# Patient Record
Sex: FEMALE | Race: WHITE | RURAL
[De-identification: names, ages, dates, MRNs, and addresses within clinical notes are randomized per-mention and may not be internally consistent; named-entity substitution may affect disease eponyms.]

---

## 2017-01-03 ENCOUNTER — OFFICE VISIT (OUTPATIENT)
Dept: FAMILY MEDICINE CLINIC | Age: 22
End: 2017-01-03

## 2017-01-03 VITALS
WEIGHT: 144.2 LBS | HEIGHT: 64 IN | TEMPERATURE: 98.2 F | RESPIRATION RATE: 18 BRPM | DIASTOLIC BLOOD PRESSURE: 71 MMHG | BODY MASS INDEX: 24.62 KG/M2 | HEART RATE: 96 BPM | SYSTOLIC BLOOD PRESSURE: 115 MMHG | OXYGEN SATURATION: 98 %

## 2017-01-03 DIAGNOSIS — J45.40 MODERATE PERSISTENT ASTHMA WITHOUT COMPLICATION: Primary | ICD-10-CM

## 2017-01-03 NOTE — PATIENT INSTRUCTIONS
Asthma Action Plan: After Your Visit  Your Care Instructions  An asthma action plan is based on peak flow and asthma symptoms. Sorting symptoms and peak flow into red, yellow, and green \"zones\" can help you know how bad your asthma is and what actions you should take. Work with your doctor to make your plan. An action plan may include:  · The peak flow readings and symptoms for each zone. · What medicines to take in each zone. · When to call a doctor. · A list of emergency contact numbers. · A list of your asthma triggers. Follow-up care is a key part of your treatment and safety. Be sure to make and go to all appointments, and call your doctor if you are having problems. It's also a good idea to know your test results and keep a list of the medicines you take. How can you care for yourself at home? · Take your daily medicines to help minimize long-term damage and avoid asthma attacks. · Check your peak flow every morning and evening. This is the best way to know how well your lungs are working. · Check your action plan to see what zone you are in.  ¨ If you are in the green zone, keep taking your daily asthma medicines as prescribed. ¨ If you are in the yellow zone, you may be having or will soon have an asthma attack. You may not have any symptoms, but your lungs are not working as well as they should. Take the medicines listed in your action plan. If you stay in the yellow zone, your doctor may need to increase the dose or add a medicine. ¨ If you are in the red zone, follow your action plan. If your symptoms or peak flow don't improve soon, you may need to go to the emergency room or be admitted to the hospital.  · Use an asthma diary. Write down your peak flow readings in the asthma diary. If you have an attack, write down what caused it (if you know), the symptoms, and what medicine you took.   · Make sure you know how and when to call your doctor or go to the hospital.  · Take both the asthma action plan and the asthma diary--along with your peak flow meter and medicines--when you see your doctor. Tell your doctor if you are having trouble following your action plan. When should you call for help? Call 911 anytime you think you may need emergency care. For example, call if:  · You have severe trouble breathing. Call your doctor now or seek immediate medical care if:  · Your symptoms do not get better after you have followed your asthma action plan. · You cough up yellow, dark brown, or bloody mucus (sputum). Watch closely for changes in your health, and be sure to contact your doctor if:  · Your coughing and wheezing get worse. · You need to use quick-relief medicine on more than 2 days a week (unless it is just for exercise). · You need help figuring out what is triggering your asthma attacks. Where can you learn more? Go to [employer] Live [employer]  Enter B511 in the search box to learn more about \"Asthma Action Plan: After Your Visit. \"   © 5242-4841 [employer]. Care instructions adapted under license by [employer] (which disclaims liability or warranty for this information). This care instruction is for use with your licensed healthcare professional. If you have questions about a medical condition or this instruction, always ask your healthcare professional. [address] any warranty or liability for your use of this information. Content Version: 63.9.218826; Last Revised: March 9, 2012                 Asthma in Adults: Care Instructions  Your Care Instructions    During an asthma attack, your airways swell and narrow as a reaction to certain things (triggers). This makes it hard to breathe. You may be able to prevent asthma attacks if you avoid the things that set off your asthma symptoms. Keeping your asthma under control and treating symptoms before they get bad can help you avoid severe attacks.   If you can control your asthma, you may be able to do all of your normal daily activities. You may also avoid asthma attacks and trips to the hospital.  Follow-up care is a key part of your treatment and safety. Be sure to make and go to all appointments, and call your doctor if you are having problems. It's also a good idea to know your test results and keep a list of the medicines you take. How can you care for yourself at home? · Follow your asthma action plan so you can manage your symptoms at home. An asthma action plan will help you prevent and control airway reactions and will tell you what to do during an asthma attack. If you do not have an asthma action plan, work with your doctor to build one. · Take your asthma medicine exactly as prescribed. Medicine plays an important role in controlling asthma. Talk to your doctor right away if you have any questions about what to take and how to take it. ¨ Use your quick-relief medicine when you have symptoms of an attack. Quick-relief medicine often is an albuterol inhaler. Some people need to use quick-relief medicine before they exercise. ¨ Take your controller medicine every day, not just when you have symptoms. Controller medicine is usually an inhaled corticosteroid. The goal is to prevent problems before they occur. Do not use your controller medicine to try to treat an attack that has already started. It does not work fast enough to help. ¨ If your doctor prescribed corticosteroid pills to use during an attack, take them as directed. They may take hours to work, but they may shorten the attack and help you breathe better. ¨ Keep your quick-relief medicine with you at all times. · Talk to your doctor before using other medicines. Some medicines, such as aspirin, can cause asthma attacks in some people. · Check yourself for asthma symptoms to know which step to follow in your action plan. Watch for things like being short of breath, having chest tightness, coughing, and wheezing.  Also notice if symptoms wake you up at night or if you get tired quickly when you exercise. · If you have a peak flow meter, use it to check how well you are breathing. This can help you predict when an asthma attack is going to occur. Then you can take medicine to prevent the asthma attack or make it less severe. · See your doctor regularly. These visits will help you learn more about asthma and what you can do to control it. Your doctor will monitor your treatment to make sure the medicine is helping you. · Keep track of your asthma attacks and your treatment. After you have had an attack, write down what triggered it, what helped end it, and any concerns you have about your asthma action plan. Take your diary when you see your doctor. You can then review your asthma action plan and decide if it is working. · Do not smoke or allow others to smoke around you. Avoid smoky places. Smoking makes asthma worse. If you need help quitting, talk to your doctor about stop-smoking programs and medicines. These can increase your chances of quitting for good. · Learn what triggers an asthma attack for you, and avoid the triggers when you can. Common triggers include colds, smoke, air pollution, dust, pollen, mold, pets, cockroaches, stress, and cold air. · Avoid colds and the flu. Get a pneumococcal vaccine shot. If you have had one before, ask your doctor whether you need a second dose. Get a flu vaccine every fall. If you must be around people with colds or the flu, wash your hands often. When should you call for help? Call 911 anytime you think you may need emergency care. For example, call if:  · You have severe trouble breathing. Call your doctor now or seek immediate medical care if:  · Your symptoms do not get better after you have followed your asthma action plan. · You cough up yellow, dark brown, or bloody mucus (sputum).   Watch closely for changes in your health, and be sure to contact your doctor if:  · Your coughing and wheezing get worse. · You need to use quick-relief medicine on more than 2 days a week (unless it is just for exercise). · You need help figuring out what is triggering your asthma attacks. Where can you learn more? Go to http://last-nasir.info/. Enter P597 in the search box to learn more about \"Asthma in Adults: Care Instructions. \"  Current as of: May 23, 2016  Content Version: 11.1  © 5614-3747 [employer]. Care instructions adapted under license by [employer] (which disclaims liability or warranty for this information). If you have questions about a medical condition or this instruction, always ask your healthcare professional. [place] [address] any warranty or liability for your use of this information. Learning About Asthma  What is asthma? Asthma is a long-term condition that affects your breathing. It causes the airways that lead to the lungs to swell. People with asthma may have asthma attacks. During an asthma attack, the airways tighten and become narrower. This makes it hard to breathe, and you may wheeze or cough. If you have a bad asthma attack, you may need emergency care. Asthma affects people in different ways. Some people only have asthma attacks during allergy season, or when they breathe in cold air, or when they exercise. Others have many bad attacks that send them to the doctor often. What are the symptoms? Symptoms of asthma can be mild or severe. You may have mild attacks now and then, you may have severe symptoms every day, or you may have something in between. How often you have symptoms can also change. When you have asthma, you may:  · Wheeze, making a loud or soft whistling noise when you breathe in and out. · Cough a lot. · Feel tightness in your chest.  · Feel short of breath. · Have trouble sleeping because of coughing or having a hard time breathing. · Get tired quickly during exercise.   Your symptoms may be worse at night. How can you prevent asthma attacks? Certain things can make asthma symptoms worse. These are called triggers. When you are around a trigger, an asthma attack is more likely. Common triggers include:  · Cigarette smoke or air pollution. · Things you are allergic to, such as:  ¨ Pollen, mold, or dust mites. ¨ Pet hair, skin, or saliva. · Illnesses, like colds, flu, or pneumonia. · Exercise. · Dry, cold air. Here are some ways to avoid a few common triggers:  · Do not smoke or allow others to smoke around you. If you need help quitting, talk to your doctor about stop-smoking programs and medicines. These can increase your chances of quitting for good. · If there is a lot of pollution, pollen, or dust outside, stay at home and keep your windows closed. Use an air conditioner or air filter in your home. Check your local weather report or newspaper for air quality and pollen reports. · Get the flu vaccine every year. Talk to your doctor about getting a pneumococcal shot. Wash your hands often to prevent infections. · Avoid exercising outdoors in cold weather. If you are outdoors in cold weather, wear a scarf around your face and breathe through your nose. How is asthma treated? There are two parts to treating asthma, which are outlined in your asthma action plan. The goals are to:  · Control asthma over the long term. The asthma action plan tells you which medicine you may need to take every day. This is called a controller medicine. It helps to reduce the swelling of the airways and prevent asthma attacks. · Treat asthma attacks when they occur. The asthma action plan tells you what to do when you have an asthma attack. It helps you identify triggers that can cause your attacks. You use quick-relief medicine during an attack. The asthma plan also helps you track your symptoms and know how well the treatment is working.   Follow-up care is a key part of your treatment and safety. Be sure to make and go to all appointments, and call your doctor if you are having problems. It's also a good idea to know your test results and keep a list of the medicines you take. Where can you learn more? Go to http://last-nasir.info/. Enter 9187 [id number] in the search box to learn more about \"Learning About Asthma. \"  Current as of: May 23, 2016  Content Version: 11.1  © 2248-1906 [employer]. Care instructions adapted under license by [employer] (which disclaims liability or warranty for this information). If you have questions about a medical condition or this instruction, always ask your healthcare professional. [address] any warranty or liability for your use of this information. Learning About the HPV Vaccine  What is the HPV vaccine? The HPV (human papillomavirus) vaccine protects against HPV. HPV is a common sexually transmitted infection (STI). There are many types of HPV. Some types of the virus can cause genital warts. Other types can cause cervical and some uncommon cancers, such as anal and vaginal cancer. Cervarix, Gardasil, and Gardasil 9 are the three types of HPV vaccines. They protect against the most common HPV types that can cause serious problems. HPV vaccines are given as a series of 3 shots: a first shot followed by a second shot 1 to 2 months later, and a third shot 6 months after the first shot. Who should get the vaccine? Experts recommend that girls ages 6 to 15 get the HPV vaccine. It can be given to girls starting at age 5 or 8. It's also recommended for girls and young women ages 15 to 32 who didn't get the vaccine when they were younger. It isn't recommended for women who are pregnant. Experts recommend that the HPV vaccine be given to boys ages 6 to 15. The series can be started at age 5 or 8. Akshat Narayan men ages 15 to 24 need to get this vaccine if they didn't get it when they were younger.  It is also recommended for men ages 25 to 32 who have a weak immune system or who have sex with men. What else do you need to know? The best time for a person to get the vaccine is before becoming sexually active. This is because the vaccine works best before there is any chance of infection with HPV. When the vaccine is given at this time, it can prevent almost all infection by the types of HPV the vaccine guards against. If the person has already been infected with the virus, the vaccine may not provide protection against the virus. Having the HPV vaccine does not change your need for Pap tests. Women who have had the HPV vaccine should follow the same Pap test schedule as women who have not had the vaccine. If you are a parent of a child who's getting the shot, talk to your child about HPV and the vaccine. It's a chance to teach your child about safer sex and STIs. Having your child get the shot doesn't mean you're giving your child permission to have sex. The vaccine can have side effects. Common side effects from the vaccine include headache, fever, and redness or swelling at the site of the shot. More serious side effects, such as fainting, are rare. · Take an over-the-counter pain medicine, such as acetaminophen (Tylenol) or ibuprofen (Advil, Motrin), to relieve common side effects. Read and follow all instructions on the label. · Put ice or a cold pack on the sore area for 10 to 20 minutes at a time. Put a thin cloth between the ice and your skin. Where can you learn more? Go to http://last-nasir.info/. Enter Z773 in the search box to learn more about \"Learning About the HPV Vaccine. \"  Current as of: February 9, 2016  Content Version: 11.1  © 1268-9669 [employer]. Care instructions adapted under license by [employer] (which disclaims liability or warranty for this information).  If you have questions about a medical condition or this instruction, always ask your healthcare professional. [place] [address] any warranty or liability for your use of this information.

## 2017-01-03 NOTE — MR AVS SNAPSHOT
Visit Information Date & Time Provider Department Dept. Phone Encounter #  
 1/3/2017  9:00 AM Magnus Amor NP [address] [phone] [id number] Upcoming Health Maintenance Date Due Hepatitis A Peds Age 1-18 (1 of 2 - Standard Series) 11/3/1998 HPV AGE 9Y-26Y (1 of 3 - Female 3 Dose Series) 11/3/2008 INFLUENZA AGE 9 TO ADULT 8/1/2016 Pneumococcal 19-64 Medium Risk (1 of 1 - PPSV23) 11/3/2016 DTaP/Tdap/Td series (7 - Td) 5/29/2018 Allergies as of 1/3/2017  Review Complete On: 1/3/2017 By: Andreina Landrum LPN Severity Noted Reaction Type Reactions Tree Nut  08/27/2013    Unknown (comments) Current Immunizations  Never Reviewed Name Date DTaP 11/29/2001, 2/11/1999, 5/12/1998, 3/11/1998, 1/6/1998 Hep B Vaccine 5/12/1998, 1997, 1997 MMR 11/29/2001, 2/11/1999 Meningococcal Vaccine 7/13/2011 Poliovirus vaccine 11/29/2001, 11/6/1998, 3/11/1998, 1/6/1998 TB Skin Test (PPD) 11/6/1998 TB Skin Test (PPD) Intradermal 9/2/2014 Tdap 5/29/2008 Varicella Virus Vaccine 11/6/1998 Not reviewed this visit Vitals BP Pulse Temp Resp Height(growth percentile) 115/71 (69 %/ 73 %)* (BP 1 Location: Left arm, BP Patient Position: Sitting) 96 98.2 °F (36.8 °C) (Temporal) 18 5' 4\" (1.626 m) (46 %, Z= -0.11) Weight(growth percentile) SpO2 BMI OB Status Smoking Status 144 lb 3.2 oz (65.4 kg) (76 %, Z= 0.71) 98% 24.75 kg/m2 (78 %, Z= 0.79) Having regular periods Never Smoker *BP percentiles are based on NHBPEP's 4th Report Growth percentiles are based on CDC 2-20 Years data. Vitals History BMI and BSA Data Body Mass Index Body Surface Area 24.75 kg/m 2 1.72 m 2 Preferred Pharmacy Pharmacy Name Phone [address], [address] [place] [phone] Your Updated Medication List  
  
   
 This list is accurate as of: 1/3/17  9:37 AM.  Always use your most recent med list.  
  
  
  
  
 * albuterol 2.5 mg /3 mL (0.083 %) nebulizer solution Commonly known as:  PROVENTIL VENTOLIN  
3 mL by Nebulization route every four (4) hours as needed for Wheezing. * albuterol 90 mcg/actuation inhaler Commonly known as:  PROVENTIL HFA, VENTOLIN HFA, PROAIR HFA Take 2 Puffs by inhalation every four (4) hours as needed for Wheezing.  
  
 loratadine 10 mg tablet Commonly known as:  Razia Ruperto Take 10 mg by mouth.  
  
 mometasone 110 mcg (7 doses) Aepb Commonly known as:  Yanet Hippo Take 1 Puff by inhalation two (2) times a day. NASACORT AQ 55 mcg nasal inhaler Generic drug:  triamcinolone 1 Spray daily. * Notice: This list has 2 medication(s) that are the same as other medications prescribed for you. Read the directions carefully, and ask your doctor or other care provider to review them with you. Prescriptions Sent to Pharmacy Refills  
 mometasone (ASMANEX TWISTHALER) 110 mcg (7 doses) aepb 2 Sig: Take 1 Puff by inhalation two (2) times a day. Class: Normal  
 Pharmacy: [address], [address] [facility] #: [phone] Route: Inhalation Patient Instructions Asthma Action Plan: After Your Visit Your Care Instructions An asthma action plan is based on peak flow and asthma symptoms. Sorting symptoms and peak flow into red, yellow, and green \"zones\" can help you know how bad your asthma is and what actions you should take. Work with your doctor to make your plan. An action plan may include: · The peak flow readings and symptoms for each zone. · What medicines to take in each zone. · When to call a doctor. · A list of emergency contact numbers. · A list of your asthma triggers. Follow-up care is a key part of your treatment and safety.  Be sure to make and go to all appointments, and call your doctor if you are having problems. It's also a good idea to know your test results and keep a list of the medicines you take. How can you care for yourself at home? · Take your daily medicines to help minimize long-term damage and avoid asthma attacks. · Check your peak flow every morning and evening. This is the best way to know how well your lungs are working. · Check your action plan to see what zone you are in. 
¨ If you are in the green zone, keep taking your daily asthma medicines as prescribed. ¨ If you are in the yellow zone, you may be having or will soon have an asthma attack. You may not have any symptoms, but your lungs are not working as well as they should. Take the medicines listed in your action plan. If you stay in the yellow zone, your doctor may need to increase the dose or add a medicine. ¨ If you are in the red zone, follow your action plan. If your symptoms or peak flow don't improve soon, you may need to go to the emergency room or be admitted to the hospital. 
· Use an asthma diary. Write down your peak flow readings in the asthma diary. If you have an attack, write down what caused it (if you know), the symptoms, and what medicine you took. · Make sure you know how and when to call your doctor or go to the hospital. 
· Take both the asthma action plan and the asthma diaryalong with your peak flow meter and medicineswhen you see your doctor. Tell your doctor if you are having trouble following your action plan. When should you call for help? Call 911 anytime you think you may need emergency care. For example, call if: 
· You have severe trouble breathing. Call your doctor now or seek immediate medical care if: 
· Your symptoms do not get better after you have followed your asthma action plan. · You cough up yellow, dark brown, or bloody mucus (sputum). Watch closely for changes in your health, and be sure to contact your doctor if: · Your coughing and wheezing get worse. · You need to use quick-relief medicine on more than 2 days a week (unless it is just for exercise). · You need help figuring out what is triggering your asthma attacks. Where can you learn more? Go to [employer].be Enter 278 5615 in the search box to learn more about \"Asthma Action Plan: After Your Visit. \"  
© 6544-7859 Healthwise, Incorporated. Care instructions adapted under license by [employer] (which disclaims liability or warranty for this information). This care instruction is for use with your licensed healthcare professional. If you have questions about a medical condition or this instruction, always ask your healthcare professional. [address] any warranty or liability for your use of this information. Content Version: 75.8.917188; Last Revised: March 9, 2012 Asthma in Adults: Care Instructions Your Care Instructions During an asthma attack, your airways swell and narrow as a reaction to certain things (triggers). This makes it hard to breathe. You may be able to prevent asthma attacks if you avoid the things that set off your asthma symptoms. Keeping your asthma under control and treating symptoms before they get bad can help you avoid severe attacks. If you can control your asthma, you may be able to do all of your normal daily activities. You may also avoid asthma attacks and trips to the hospital. 
Follow-up care is a key part of your treatment and safety. Be sure to make and go to all appointments, and call your doctor if you are having problems. It's also a good idea to know your test results and keep a list of the medicines you take. How can you care for yourself at home? · Follow your asthma action plan so you can manage your symptoms at home.  An asthma action plan will help you prevent and control airway reactions and will tell you what to do during an asthma attack. If you do not have an asthma action plan, work with your doctor to build one. · Take your asthma medicine exactly as prescribed. Medicine plays an important role in controlling asthma. Talk to your doctor right away if you have any questions about what to take and how to take it. ¨ Use your quick-relief medicine when you have symptoms of an attack. Quick-relief medicine often is an albuterol inhaler. Some people need to use quick-relief medicine before they exercise. ¨ Take your controller medicine every day, not just when you have symptoms. Controller medicine is usually an inhaled corticosteroid. The goal is to prevent problems before they occur. Do not use your controller medicine to try to treat an attack that has already started. It does not work fast enough to help. ¨ If your doctor prescribed corticosteroid pills to use during an attack, take them as directed. They may take hours to work, but they may shorten the attack and help you breathe better. ¨ Keep your quick-relief medicine with you at all times. · Talk to your doctor before using other medicines. Some medicines, such as aspirin, can cause asthma attacks in some people. · Check yourself for asthma symptoms to know which step to follow in your action plan. Watch for things like being short of breath, having chest tightness, coughing, and wheezing. Also notice if symptoms wake you up at night or if you get tired quickly when you exercise. · If you have a peak flow meter, use it to check how well you are breathing. This can help you predict when an asthma attack is going to occur. Then you can take medicine to prevent the asthma attack or make it less severe. · See your doctor regularly. These visits will help you learn more about asthma and what you can do to control it. Your doctor will monitor your treatment to make sure the medicine is helping you. · Keep track of your asthma attacks and your treatment. After you have had an attack, write down what triggered it, what helped end it, and any concerns you have about your asthma action plan. Take your diary when you see your doctor. You can then review your asthma action plan and decide if it is working. · Do not smoke or allow others to smoke around you. Avoid smoky places. Smoking makes asthma worse. If you need help quitting, talk to your doctor about stop-smoking programs and medicines. These can increase your chances of quitting for good. · Learn what triggers an asthma attack for you, and avoid the triggers when you can. Common triggers include colds, smoke, air pollution, dust, pollen, mold, pets, cockroaches, stress, and cold air. · Avoid colds and the flu. Get a pneumococcal vaccine shot. If you have had one before, ask your doctor whether you need a second dose. Get a flu vaccine every fall. If you must be around people with colds or the flu, wash your hands often. When should you call for help? Call 911 anytime you think you may need emergency care. For example, call if: 
· You have severe trouble breathing. Call your doctor now or seek immediate medical care if: 
· Your symptoms do not get better after you have followed your asthma action plan. · You cough up yellow, dark brown, or bloody mucus (sputum). Watch closely for changes in your health, and be sure to contact your doctor if: 
· Your coughing and wheezing get worse. · You need to use quick-relief medicine on more than 2 days a week (unless it is just for exercise). · You need help figuring out what is triggering your asthma attacks. Where can you learn more? Go to http://last-nasir.info/. Enter P597 in the search box to learn more about \"Asthma in Adults: Care Instructions. \" Current as of: May 23, 2016 Content Version: 11.1 © 0060-5809 [employer] Incorporated.  Care instructions adapted under license by [facility] [address] (which disclaims liability or warranty for this information). If you have questions about a medical condition or this instruction, always ask your healthcare professional. [address] any warranty or liability for your use of this information. Learning About Asthma What is asthma? Asthma is a long-term condition that affects your breathing. It causes the airways that lead to the lungs to swell. People with asthma may have asthma attacks. During an asthma attack, the airways tighten and become narrower. This makes it hard to breathe, and you may wheeze or cough. If you have a bad asthma attack, you may need emergency care. Asthma affects people in different ways. Some people only have asthma attacks during allergy season, or when they breathe in cold air, or when they exercise. Others have many bad attacks that send them to the doctor often. What are the symptoms? Symptoms of asthma can be mild or severe. You may have mild attacks now and then, you may have severe symptoms every day, or you may have something in between. How often you have symptoms can also change. When you have asthma, you may: · Wheeze, making a loud or soft whistling noise when you breathe in and out. · Cough a lot. · Feel tightness in your chest. 
· Feel short of breath. · Have trouble sleeping because of coughing or having a hard time breathing. · Get tired quickly during exercise. Your symptoms may be worse at night. How can you prevent asthma attacks? Certain things can make asthma symptoms worse. These are called triggers. When you are around a trigger, an asthma attack is more likely. Common triggers include: · Cigarette smoke or air pollution. · Things you are allergic to, such as: 
¨ Pollen, mold, or dust mites. ¨ Pet hair, skin, or saliva. · Illnesses, like colds, flu, or pneumonia. · Exercise. · Dry, cold air. Here are some ways to avoid a few common triggers: · Do not smoke or allow others to smoke around you. If you need help quitting, talk to your doctor about stop-smoking programs and medicines. These can increase your chances of quitting for good. · If there is a lot of pollution, pollen, or dust outside, stay at home and keep your windows closed. Use an air conditioner or air filter in your home. Check your local weather report or newspaper for air quality and pollen reports. · Get the flu vaccine every year. Talk to your doctor about getting a pneumococcal shot. Wash your hands often to prevent infections. · Avoid exercising outdoors in cold weather. If you are outdoors in cold weather, wear a scarf around your face and breathe through your nose. How is asthma treated? There are two parts to treating asthma, which are outlined in your asthma action plan. The goals are to: 
· Control asthma over the long term. The asthma action plan tells you which medicine you may need to take every day. This is called a controller medicine. It helps to reduce the swelling of the airways and prevent asthma attacks. · Treat asthma attacks when they occur. The asthma action plan tells you what to do when you have an asthma attack. It helps you identify triggers that can cause your attacks. You use quick-relief medicine during an attack. The asthma plan also helps you track your symptoms and know how well the treatment is working. Follow-up care is a key part of your treatment and safety. Be sure to make and go to all appointments, and call your doctor if you are having problems. It's also a good idea to know your test results and keep a list of the medicines you take. Where can you learn more? Go to http://last-nasir.info/. Enter 9187 5645 in the search box to learn more about \"Learning About Asthma. \" Current as of: May 23, 2016 Content Version: 11.1 © 3486-9864 Healthwise, Incorporated. Care instructions adapted under license by [employer] (which disclaims liability or warranty for this information). If you have questions about a medical condition or this instruction, always ask your healthcare professional. [address] any warranty or liability for your use of this information. Introducing [facility]! Carmela Aguilar introduces [employer] patient portal. Now you can access parts of your medical record, email your doctor's office, and request medication refills online. 1. In your internet browser, go to [email or web address] 2. Click on the First Time User? Click Here link in the Sign In box. You will see the New Member Sign Up page. 3. Enter your [employer] Access Code exactly as it appears below. You will not need to use this code after youve completed the sign-up process. If you do not sign up before the expiration date, you must request a new code. · [employer] Access Code: [email or web address] Expires: 4/3/2017  9:35 AM 
 
4. Enter the last four digits of your Social Security Number (xxxx) and Date of Birth (mm/dd/yyyy) as indicated and click Submit. You will be taken to the next sign-up page. 5. Create a [employer] ID. This will be your [employer] login ID and cannot be changed, so think of one that is secure and easy to remember. 6. Create a [employer] password. You can change your password at any time. 7. Enter your Password Reset Question and Answer. This can be used at a later time if you forget your password. 8. Enter your e-mail address. You will receive e-mail notification when new information is available in [address] 9. Click Sign Up. You can now view and download portions of your medical record. 10. Click the Download Summary menu link to download a portable copy of your medical information.  
 
If you have questions, please visit the Frequently Asked Questions section of the [employer]. Remember, [employer] is NOT to be used for urgent needs. For medical emergencies, dial 911. Now available from your iPhone and Android! Please provide this summary of care documentation to your next provider. Your primary care clinician is listed as [place]. If you have any questions after today's visit, please call [phone].

## 2017-01-03 NOTE — PROGRESS NOTES
Subjective:     Carlos Mcmahan is a 25 y.o. female who presents today with the following:  Chief Complaint   Patient presents with    Asthma     med refill   Kimmy Jade attends [employer] as a senior graduating in December 2017. Asthma flare ups mostly in the fall and has not gotten a refill for mometasone. She uses albuterol prior to exercising. While on winter break she has been using her rescue inhaler daily. ROS:  Gen: denies fever, chills, fatigue, weight loss, weight gain  HEENT:denies blurry vision, nasal congestion, sore throat  Resp: denies dypsnea, cough, wheezing  CV: denies chest pain, murmurs, palpitations  Abd: denies nausea, vomiting, diarrhea, constipation  Neuro: denies numbness/tingling  Endo: denies polyuria, polydipsia, heat/cold intolerance  Heme: no lymphadenopathy    Allergies   Allergen Reactions    Tree Nut Unknown (comments)         Current Outpatient Prescriptions:     triamcinolone (NASACORT AQ) 55 mcg nasal inhaler, 1 Spray daily. , Disp: , Rfl:     loratadine (CLARITIN) 10 mg tablet, Take 10 mg by mouth., Disp: , Rfl:     mometasone (ASMANEX TWISTHALER) 110 mcg (7 doses) aepb, Take 1 Puff by inhalation two (2) times a day., Disp: 0.1 g, Rfl: 2    albuterol (PROVENTIL HFA, VENTOLIN HFA, PROAIR HFA) 90 mcg/actuation inhaler, Take 2 Puffs by inhalation every four (4) hours as needed for Wheezing., Disp: 1 Inhaler, Rfl: 2    albuterol (PROVENTIL VENTOLIN) 2.5 mg /3 mL (0.083 %) nebulizer solution, 3 mL by Nebulization route every four (4) hours as needed for Wheezing., Disp: 1 Package, Rfl: 2    Past Medical History   Diagnosis Date    Asthma        No past surgical history on file.     History   Smoking Status    Never Smoker   Smokeless Tobacco    Not on file       Social History     Social History    Marital status: SINGLE     Spouse name: N/A    Number of children: N/A    Years of education: N/A     Social History Main Topics    Smoking status: Never Smoker    Smokeless tobacco: None    Alcohol use No    Drug use: None    Sexual activity: Not Asked     Other Topics Concern    None     Social History Narrative       Family History   Problem Relation Age of Onset    Cancer Maternal Grandmother      bone         Objective:     Visit Vitals    /71 (BP 1 Location: Left arm, BP Patient Position: Sitting)    Pulse 96    Temp 98.2 °F (36.8 °C) (Temporal)    Resp 18    Ht 5' 4\" (1.626 m)    Wt 144 lb 3.2 oz (65.4 kg)    SpO2 98%    BMI 24.75 kg/m2     Body mass index is 24.75 kg/(m^2). Gen: alert, oriented, no acute distress  Head: normocephalic, atraumatic  Eyes:sclera clear, conjunctiva clear  Oral: moist mucus membranes, no oral lesions, no pharyngeal exudate, no pharyngeal erythema  Neck: symmetric normal sized thyroid, no carotid bruits  Resp: Normal work of breathing, lungs CTAB, no w/r/r  CV: S1, S2 normal.  No murmurs, rubs, or gallops. Abd:  Normal bowel sounds. Soft, not tender, not distended. Skin: no rash             Extremities: no edema    Results for orders placed or performed in visit on 11/02/15   AMB POC RAPID STREP A   Result Value Ref Range    VALID INTERNAL CONTROL POC Yes     Group A Strep Ag Negative Negative       No results found for this visit on 01/03/17. Assessment/ Plan:     Asthma  Renew mometasone continue current therapy      Verbal and written instructions (see AVS) provided.  Patient expresses understanding of diagnosis and treatment plan.     ARNOLDO Hsu

## 2017-01-09 ENCOUNTER — OFFICE VISIT (OUTPATIENT)
Dept: FAMILY MEDICINE CLINIC | Age: 22
End: 2017-01-09

## 2017-01-09 VITALS
WEIGHT: 147 LBS | DIASTOLIC BLOOD PRESSURE: 70 MMHG | BODY MASS INDEX: 25.1 KG/M2 | SYSTOLIC BLOOD PRESSURE: 102 MMHG | RESPIRATION RATE: 20 BRPM | TEMPERATURE: 98.7 F | HEIGHT: 64 IN | HEART RATE: 100 BPM

## 2017-01-09 DIAGNOSIS — J03.90 TONSILLITIS: ICD-10-CM

## 2017-01-09 DIAGNOSIS — J02.9 SORE THROAT: Primary | ICD-10-CM

## 2017-01-09 LAB
S PYO AG THROAT QL: NEGATIVE
VALID INTERNAL CONTROL?: YES

## 2017-01-09 RX ORDER — AMOXICILLIN 500 MG/1
500 CAPSULE ORAL 3 TIMES DAILY
Qty: 30 CAP | Refills: 0 | Status: SHIPPED | OUTPATIENT
Start: 2017-01-09 | End: 2017-10-16 | Stop reason: ALTCHOICE

## 2017-01-09 NOTE — MR AVS SNAPSHOT
Visit Information Date & Time Provider Department Dept. Phone Encounter #  
 1/9/2017  7:45 AM Xin Draper, [address] [phone] [id number] Follow-up Instructions Return if symptoms worsen or fail to improve. Upcoming Health Maintenance Date Due Hepatitis A Peds Age 1-18 (2 of 3 - Twinrix 3-Dose Series) 1/31/2017 HPV AGE 9Y-26Y (2 of 3 - Female 3 Dose Series) 2/28/2017 DTaP/Tdap/Td series (7 - Td) 5/29/2018 Allergies as of 1/9/2017  Review Complete On: 1/9/2017 By: Xin Draper MD  
  
 Severity Noted Reaction Type Reactions Tree Nut  08/27/2013    Unknown (comments) Current Immunizations  Never Reviewed Name Date DTaP 11/29/2001, 2/11/1999, 5/12/1998, 3/11/1998, 1/6/1998 Hep B Vaccine 5/12/1998, 1997, 1997 MMR 11/29/2001, 2/11/1999 Meningococcal Vaccine 7/13/2011 Poliovirus vaccine 11/29/2001, 11/6/1998, 3/11/1998, 1/6/1998 TB Skin Test (PPD) 11/6/1998 TB Skin Test (PPD) Intradermal 9/2/2014 Tdap 5/29/2008 Varicella Virus Vaccine 11/6/1998 Not reviewed this visit You Were Diagnosed With   
  
 Codes Comments Sore throat    -  Primary ICD-10-CM: J02.9 ICD-9-CM: 171 Tonsillitis     ICD-10-CM: J03.90 ICD-9-CM: 678 Vitals BP Pulse Temp Resp Height(growth percentile) 102/70 (23 %/ 70 %)* (BP 1 Location: Left arm, BP Patient Position: Sitting) 100 98.7 °F (37.1 °C) (Temporal) 20 5' 4\" (1.626 m) (46 %, Z= -0.11) Weight(growth percentile) BMI OB Status Smoking Status 147 lb (66.7 kg) (79 %, Z= 0.80) 25.23 kg/m2 (81 %, Z= 0.88) Having regular periods Never Smoker *BP percentiles are based on NHBPEP's 4th Report Growth percentiles are based on CDC 2-20 Years data. Vitals History BMI and BSA Data Body Mass Index Body Surface Area  
 25.23 kg/m 2 1.74 m 2 Preferred Pharmacy Pharmacy Name Phone [facility]/PHARMACY #[id number] Durbin [facility] [place] [phone] Your Updated Medication List  
  
   
This list is accurate as of: 1/9/17  8:28 AM.  Always use your most recent med list.  
  
  
  
  
 * albuterol 2.5 mg /3 mL (0.083 %) nebulizer solution Commonly known as:  PROVENTIL VENTOLIN  
3 mL by Nebulization route every four (4) hours as needed for Wheezing. * albuterol 90 mcg/actuation inhaler Commonly known as:  PROVENTIL HFA, VENTOLIN HFA, PROAIR HFA Take 2 Puffs by inhalation every four (4) hours as needed for Wheezing. amoxicillin 500 mg capsule Commonly known as:  AMOXIL Take 1 Cap by mouth three (3) times daily. loratadine 10 mg tablet Commonly known as:  Coke Hippo Take 10 mg by mouth.  
  
 mometasone 110 mcg (7 doses) Aepb Commonly known as:  Robins Babinski Take 1 Puff by inhalation two (2) times a day. NASACORT AQ 55 mcg nasal inhaler Generic drug:  triamcinolone 1 Spray daily. * Notice: This list has 2 medication(s) that are the same as other medications prescribed for you. Read the directions carefully, and ask your doctor or other care provider to review them with you. Prescriptions Sent to Pharmacy Refills  
 amoxicillin (AMOXIL) 500 mg capsule 0 Sig: Take 1 Cap by mouth three (3) times daily. Class: Normal  
 Pharmacy: [facility]/pharmacy #[id number] Mikey Durbin [facility] [place] Ph #: [phone] Route: Oral  
  
We Performed the Following AMB POC RAPID STREP A [56617 CPT(R)] Follow-up Instructions Return if symptoms worsen or fail to improve. Introducing [facility] HE[facility] SERVICES! [facility] introduces [employer] patient portal. Now you can access parts of your medical record, email your doctor's office, and request medication refills online. 1. In your internet browser, go to [email or web address] 2. Click on the First Time User? Click Here link in the Sign In box. You will see the New Member Sign Up page. 3. Enter your [employer] Access Code exactly as it appears below. You will not need to use this code after youve completed the sign-up process. If you do not sign up before the expiration date, you must request a new code. · [employer] Access Code: [email or web address] Expires: 4/3/2017  9:35 AM 
 
4. Enter the last four digits of your Social Security Number (xxxx) and Date of Birth (mm/dd/yyyy) as indicated and click Submit. You will be taken to the next sign-up page. 5. Create a [employer] ID. This will be your [employer] login ID and cannot be changed, so think of one that is secure and easy to remember. 6. Create a [employer] password. You can change your password at any time. 7. Enter your Password Reset Question and Answer. This can be used at a later time if you forget your password. 8. Enter your e-mail address. You will receive e-mail notification when new information is available in [address] 9. Click Sign Up. You can now view and download portions of your medical record. 10. Click the Download Summary menu link to download a portable copy of your medical information. If you have questions, please visit the Frequently Asked Questions section of the [employer] website. Remember, [employer] is NOT to be used for urgent needs. For medical emergencies, dial 911. Now available from your iPhone and Android! Please provide this summary of care documentation to your next provider. Your primary care clinician is listed as Silva To. If you have any questions after today's visit, please call [phone].

## 2017-01-09 NOTE — PROGRESS NOTES
Subjective:  Jessica Vu presents with sore throat onset yesterday some headache low-grade fever yesterday better today. No nausea vomiting. No sinus symptoms no significant ear pain no cough. She works at a  center one confirmed case of strep. No history of mono or exposure to mono  Appetite is fair fluid intakes good  No cough congestion respiratory distress does have a history of asthma no flare      Problem list reviewed as part of this encounter. Past Medical History   Diagnosis Date    Asthma       Medication list reviewed and updated. Review of Systems -as per the above and previous documentation  Objective:  Visit Vitals    /70 (BP 1 Location: Left arm, BP Patient Position: Sitting)    Pulse 100    Temp 98.7 °F (37.1 °C) (Temporal)    Resp 20    Ht 5' 4\" (1.626 m)    Wt 147 lb (66.7 kg)    BMI 25.23 kg/m2     Alert and oriented. No acute distress. HEENT Ears TMs are normal.  Canals are clear. Eyes pupils equal, round, react to light and accommodation. Extraocular movements intact. Nose patent. Mouth and throat is injected tonsils +2 slight exudate on the right  Neck supple full range of motion no thyromegaly. No carotid bruits. Shotty tender nodes on the left lymphadenopathy  Lungs clear to auscultation without wheezes, rales, or rhonchi. Heart  RRR,  S1 & S2 are normal intensity. No murmur; no gallop  Abdomen bowel sounds active. No tenderness, guarding, rebound, masses, organomegaly. Back no CVA tenderness. Extremities without clubbing, cyanosis, or edema  Neuro HMF intact. Cranial nerves II through XII grossly normal.    Strep test is negative      Assessment:  Encounter Diagnoses   Name Primary?  Sore throat Yes    Tonsillitis            Plan:  See orders. Orders Placed This Encounter    AMB POC RAPID STREP A    amoxicillin (AMOXIL) 500 mg capsule     Sig: Take 1 Cap by mouth three (3) times daily.      Dispense:  30 Cap     Refill:  0    salt water gargles ibuprofen fluids  Follow up if symptoms worsen, change, fail to improve or any new symptoms develop. There are no Patient Instructions on file for this visit.     (Please note that portions of this note were completed with a voice recognition program. Efforts were made to edit the dictations but occasionally words are mis-transcribed.)

## 2017-02-10 ENCOUNTER — OFFICE VISIT (OUTPATIENT)
Dept: FAMILY MEDICINE CLINIC | Age: 22
End: 2017-02-10

## 2017-02-10 DIAGNOSIS — Z00.00 ROUTINE GENERAL MEDICAL EXAMINATION AT A HEALTH CARE FACILITY: Primary | ICD-10-CM

## 2017-02-10 LAB
BILIRUB UR QL STRIP: NEGATIVE
GLUCOSE UR-MCNC: NEGATIVE MG/DL
KETONES P FAST UR STRIP-MCNC: NEGATIVE MG/DL
PH UR STRIP: 6.5 [PH] (ref 4.6–8)
PROT UR QL STRIP: NEGATIVE MG/DL
SP GR UR STRIP: 1.02 (ref 1–1.03)
UA UROBILINOGEN AMB POC: NORMAL (ref 0.2–1)
URINALYSIS CLARITY POC: CLEAR
URINALYSIS COLOR POC: YELLOW
URINE BLOOD POC: NEGATIVE
URINE LEUKOCYTES POC: NEGATIVE
URINE NITRITES POC: NEGATIVE

## 2017-06-27 ENCOUNTER — TELEPHONE (OUTPATIENT)
Dept: FAMILY MEDICINE CLINIC | Age: 22
End: 2017-06-27

## 2017-06-27 NOTE — TELEPHONE ENCOUNTER
----- Message from Mitch Johnson sent at 6/27/2017  8:41 AM EDT -----  Regarding: NICHOLE Mays/Telephone  Contact: [phone]  Gisselle Ava is requesting to reschedule the TB test today for and 6.29.17 for another day, please call to reschedule.

## 2017-06-28 ENCOUNTER — TELEPHONE (OUTPATIENT)
Dept: FAMILY MEDICINE CLINIC | Age: 22
End: 2017-06-28

## 2017-06-28 NOTE — TELEPHONE ENCOUNTER
----- Message from Beto Pittman sent at 6/27/2017  1:39 PM EDT -----  Regarding: DELVIN Sanders/Telephone  Pt would like a call to r/s her TB test. Attempted to transfer, but no answer. Best contact number [phone].

## 2017-07-11 ENCOUNTER — CLINICAL SUPPORT (OUTPATIENT)
Dept: FAMILY MEDICINE CLINIC | Age: 22
End: 2017-07-11

## 2017-07-11 DIAGNOSIS — Z11.1 PPD SCREENING TEST: Primary | ICD-10-CM

## 2017-07-11 DIAGNOSIS — Z11.1 TUBERCULOSIS SCREENING: ICD-10-CM

## 2017-07-11 LAB
MM INDURATION POC: NORMAL MM (ref 0–5)
PPD POC: NEGATIVE NEGATIVE

## 2017-07-11 NOTE — PATIENT INSTRUCTIONS
Tuberculin Skin Test: Care Instructions  Your Care Instructions    Tuberculosis (TB) is a bacterial infection that can damage the lungs or other parts of the body. The TB skin test can tell if you have TB bacteria in your body. Many people are exposed to TB and test positive for TB bacteria in their bodies, but they don't get the disease. TB bacteria can stay in your body without making you sick. This is because your immune system can keep TB in check. Your doctor may want you to have a TB skin test if you have been in close contact with someone who has TB. Or you may need the test if you have symptoms that might be caused by TB, such as a cough that does not go away, a fever, or weight loss. You also may get the test if you are a health care worker. During the skin test, part of a TB bacterium is injected under your skin. The test will feel like a skin prick. If you have TB bacteria in your body, a firm red bump will form at the shot site within 2 days. If the test shows that you are infected with TB (positive), your doctor probably will order more tests. A TB-positive skin test can't tell when you became infected with TB. And it can't tell whether the infection can be passed to others. Follow-up care is a key part of your treatment and safety. Be sure to make and go to all appointments, and call your doctor if you are having problems. It's also a good idea to know your test results and keep a list of the medicines you take. How can you care for yourself at home? · Do not scratch the test site. Scratching it may cause redness or swelling. This could affect the test results. · To ease itching, put a cold washcloth on the site. Then pat the site dry. · Do not cover the test site with a bandage or other dressing. · Go back to your doctor's office or hospital to have the test read on the follow-up date. This must be done between 48 and 72 hours after you get the shot. When should you call for help?   Watch closely for changes in your health, and be sure to contact your doctor if:  · You did not have your TB skin test checked by your doctor. · You have a fever or swelling in your arm. · You do not get better as expected. Where can you learn more? Go to http://last-nasir.info/. Enter ([phone] [phone] in the search box to learn more about \"Tuberculin Skin Test: Care Instructions. \"  Current as of: March 3, 2017  Content Version: 11.3  © 8162-6671 [employer]. Care instructions adapted under license by [employer] (which disclaims liability or warranty for this information). If you have questions about a medical condition or this instruction, always ask your healthcare professional. [address] any warranty or liability for your use of this information.

## 2017-07-11 NOTE — MR AVS SNAPSHOT
Visit Information Date & Time Provider Department Dept. Phone Encounter #  
 7/11/2017  8:00 AM [address] [phone] [id number] Upcoming Health Maintenance Date Due Hepatitis A Peds Age 1-18 (2 of 3 - Twinrix 3-Dose Series) 1/31/2017 HPV AGE 9Y-26Y (2 of 3 - Female 3 Dose Series) 2/28/2017 INFLUENZA AGE 9 TO ADULT 8/1/2017 DTaP/Tdap/Td series (7 - Td) 5/29/2018 Allergies as of 7/11/2017  Review Complete On: 1/9/2017 By: Chava Cabrales Severity Noted Reaction Type Reactions Tree Nut  08/27/2013    Unknown (comments) Current Immunizations  Never Reviewed Name Date DTaP 11/29/2001, 2/11/1999, 5/12/1998, 3/11/1998, 1/6/1998 Hep B Vaccine 5/12/1998, 1997, 1997 MMR 11/29/2001, 2/11/1999 Meningococcal ACWY Vaccine 7/13/2011 Poliovirus vaccine 11/29/2001, 11/6/1998, 3/11/1998, 1/6/1998 TB Skin Test (PPD) 11/6/1998 TB Skin Test (PPD) Intradermal 9/2/2014 Tdap 5/29/2008 Varicella Virus Vaccine 11/6/1998 Not reviewed this visit Vitals OB Status Smoking Status Having regular periods Never Smoker Preferred Pharmacy Pharmacy Name Phone [id number] [place] [phone] Your Updated Medication List  
  
   
This list is accurate as of: 7/11/17  8:31 AM.  Always use your most recent med list.  
  
  
  
  
 * albuterol 2.5 mg /3 mL (0.083 %) nebulizer solution Commonly known as:  PROVENTIL VENTOLIN  
3 mL by Nebulization route every four (4) hours as needed for Wheezing. * albuterol 90 mcg/actuation inhaler Commonly known as:  PROVENTIL HFA, VENTOLIN HFA, PROAIR HFA Take 2 Puffs by inhalation every four (4) hours as needed for Wheezing. amoxicillin 500 mg capsule Commonly known as:  AMOXIL Take 1 Cap by mouth three (3) times daily. loratadine 10 mg tablet Commonly known as:  Noris Mason Take 10 mg by mouth.  
  
 mometasone 110 mcg (7 doses) Aepb Commonly known as:  Alwin Barthel Take 1 Puff by inhalation two (2) times a day. NASACORT AQ 55 mcg nasal inhaler Generic drug:  triamcinolone 1 Spray daily. * Notice: This list has 2 medication(s) that are the same as other medications prescribed for you. Read the directions carefully, and ask your doctor or other care provider to review them with you. Introducing [facility]! Yazmin Adler introduces [employer] patient portal. Now you can access parts of your medical record, email your doctor's office, and request medication refills online. 1. In your internet browser, go to [email or web address] 2. Click on the First Time User? Click Here link in the Sign In box. You will see the New Member Sign Up page. 3. Enter your [employer] Access Code exactly as it appears below. You will not need to use this code after youve completed the sign-up process. If you do not sign up before the expiration date, you must request a new code. · [employer] Access Code: [email or web address] Expires: 10/9/2017  8:31 AM 
 
4. Enter the last four digits of your Social Security Number (xxxx) and Date of Birth (mm/dd/yyyy) as indicated and click Submit. You will be taken to the next sign-up page. 5. Create a [employer] ID. This will be your [employer] login ID and cannot be changed, so think of one that is secure and easy to remember. 6. Create a [employer] password. You can change your password at any time. 7. Enter your Password Reset Question and Answer. This can be used at a later time if you forget your password. 8. Enter your e-mail address. You will receive e-mail notification when new information is available in [address] 9. Click Sign Up. You can now view and download portions of your medical record.  
10. Click the Download Summary menu link to download a portable copy of your medical information. If you have questions, please visit the Frequently Asked Questions section of the [email or web address] website. Remember, [email or web address] is NOT to be used for urgent needs. For medical emergencies, dial 911. Now available from your iPhone and Android! Please provide this summary of care documentation to your next provider. Your primary care clinician is listed as Jonathan Chowdhury. If you have any questions after today's visit, please call [phone].

## 2017-10-03 ENCOUNTER — TELEPHONE (OUTPATIENT)
Dept: FAMILY MEDICINE CLINIC | Age: 22
End: 2017-10-03

## 2017-10-03 NOTE — TELEPHONE ENCOUNTER
----- Message from Leonarda Foreman sent at 10/3/2017 12:25 PM EDT -----  Regarding: DELVIN Guillen / Telephone  Pt would like to be seen on 10/16 for a Rx refill and to follow up on allergies.  [phone]

## 2017-10-16 ENCOUNTER — OFFICE VISIT (OUTPATIENT)
Dept: FAMILY MEDICINE CLINIC | Age: 22
End: 2017-10-16

## 2017-10-16 VITALS
OXYGEN SATURATION: 99 % | HEIGHT: 64 IN | TEMPERATURE: 97.6 F | BODY MASS INDEX: 23.7 KG/M2 | WEIGHT: 138.8 LBS | DIASTOLIC BLOOD PRESSURE: 68 MMHG | HEART RATE: 97 BPM | SYSTOLIC BLOOD PRESSURE: 96 MMHG

## 2017-10-16 DIAGNOSIS — J45.20 MILD INTERMITTENT ASTHMA WITHOUT COMPLICATION: Primary | ICD-10-CM

## 2017-10-16 RX ORDER — FLUTICASONE PROPIONATE 110 UG/1
2 AEROSOL, METERED RESPIRATORY (INHALATION) EVERY 12 HOURS
Qty: 1 INHALER | Refills: 11 | Status: SHIPPED | OUTPATIENT
Start: 2017-10-16 | End: 2018-10-25 | Stop reason: SDUPTHER

## 2017-10-16 RX ORDER — CETIRIZINE HCL 10 MG
TABLET ORAL
COMMUNITY

## 2017-10-16 RX ORDER — ALBUTEROL SULFATE 90 UG/1
2 AEROSOL, METERED RESPIRATORY (INHALATION)
Qty: 1 INHALER | Refills: 2 | Status: SHIPPED | OUTPATIENT
Start: 2017-10-16 | End: 2020-09-10 | Stop reason: SDUPTHER

## 2017-10-16 NOTE — PROGRESS NOTES
Subjective:     Colt Hill is a 25 y.o. 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 Interested in forensics psychology. HM: Declines immunizations at this time. Asthma: In today for refills for asthma medications. Ran out of Asmanex and needed to use albuterol inhaler more frequently over the last week. States she was feeling chest tightness and had wheezing that is usually well controlled. COMPLIANT WITH MEDICATION:     ROS:  Gen: denies fever, chills, fatigue, weight loss, weight gain  HEENT:denies blurry vision, nasal congestion, sore throat  Resp: denies dypsnea, cough, wheezing  CV: denies chest pain radiating to the jaws or arms, palpitations, lower extremity edema  Abd: denies nausea, vomiting, diarrhea, constipation  Neuro: denies numbness/tingling  Endo: denies polyuria, polydipsia, heat/cold intolerance  Heme: no lymphadenopathy    Allergies   Allergen Reactions    Tree Nut Unknown (comments)         Current Outpatient Prescriptions:     cetirizine (ZYRTEC) 10 mg tablet, Take  by mouth., Disp: , Rfl:     albuterol (PROVENTIL HFA, VENTOLIN HFA, PROAIR HFA) 90 mcg/actuation inhaler, Take 2 Puffs by inhalation every four (4) hours as needed for Wheezing., Disp: 1 Inhaler, Rfl: 2    fluticasone (FLOVENT HFA) 110 mcg/actuation inhaler, Take 2 Puffs by inhalation every twelve (12) hours. Indications: MAINTENANCE THERAPY FOR ASTHMA, Disp: 1 Inhaler, Rfl: 11    triamcinolone (NASACORT AQ) 55 mcg nasal inhaler, 1 Spray daily. , Disp: , Rfl:     loratadine (CLARITIN) 10 mg tablet, Take 10 mg by mouth., Disp: , Rfl:     Past Medical History:   Diagnosis Date    Asthma        Past Surgical History:   Procedure Laterality Date    HX WISDOM TEETH EXTRACTION 05/15/2017       History   Smoking Status    Never Smoker   Smokeless Tobacco    Not on file       Social History     Social History    Marital status: SINGLE     Spouse name: N/A    Number of children: N/A    Years of education: N/A     Social History Main Topics    Smoking status: Never Smoker    Smokeless tobacco: None    Alcohol use No    Drug use: None    Sexual activity: Not Asked     Other Topics Concern    None     Social History Narrative       Family History   Problem Relation Age of Onset    Cancer Maternal Grandmother      bone         Objective:     Visit Vitals    BP 96/68 (BP 1 Location: Right arm, BP Patient Position: Sitting)    Pulse 97    Temp 97.6 °F (36.4 °C) (Temporal)    Ht 5' 4\" (1.626 m)    Wt 138 lb 12.8 oz (63 kg)    SpO2 99%    BMI 23.82 kg/m2     Body mass index is 23.82 kg/(m^2). General: Alert and oriented. No acute distress. Well nourished  HEENT :  Ears:TMs are normal. Canals are clear. Eyes: pupils equal, round, react to light and accommodation. Extra ocular movements intact. Nose: patent. Mouth and throat is clear. Neck:supple full range of motion no thyromegaly. Trachea midline, No carotid bruits. No significant lymphadenopathy  Lungs[de-identified] clear to auscultation without wheezes, rales, or rhonchi. Heart :RRR, S1 & S2 are normal intensity. No murmur; no gallop  Abdomen: bowel sounds active. No tenderness, guarding  Back: no CVA tenderness. Extremities: without clubbing, cyanosis, or edema  Pulses: radial pulses are normal  Neuro: HMF intact. Cranial nerves II through XII grossly normal.  Motor: is 5 over 5 and symmetrical.     Results for orders placed or performed in visit on 07/11/17   AMB POC TUBERCULOSIS, INTRADERMAL (SKIN TEST)   Result Value Ref Range    PPD Negative Negative    mm Induration  mm       No results found for this visit on 10/16/17. Assessment/ Plan:     Diagnoses and all orders for this visit:    1.  Mild intermittent asthma without complication    Other orders  -     albuterol (PROVENTIL HFA, VENTOLIN HFA, PROAIR HFA) 90 mcg/actuation inhaler; Take 2 Puffs by inhalation every four (4) hours as needed for Wheezing.  -     fluticasone (FLOVENT HFA) 110 mcg/actuation inhaler; Take 2 Puffs by inhalation every twelve (12) hours. Indications: MAINTENANCE THERAPY FOR ASTHMA           Orders Placed This Encounter    cetirizine (ZYRTEC) 10 mg tablet     Sig: Take  by mouth.  albuterol (PROVENTIL HFA, VENTOLIN HFA, PROAIR HFA) 90 mcg/actuation inhaler     Sig: Take 2 Puffs by inhalation every four (4) hours as needed for Wheezing. Dispense:  1 Inhaler     Refill:  2    fluticasone (FLOVENT HFA) 110 mcg/actuation inhaler     Sig: Take 2 Puffs by inhalation every twelve (12) hours. Indications: MAINTENANCE THERAPY FOR ASTHMA     Dispense:  1 Inhaler     Refill:  11         Verbal and written instructions (see AVS) provided.  Patient expresses understanding of diagnosis and treatment plan. Follow-up Disposition:  Return in about 3 months (around 1/16/2018). Labs and follow up for asthma.      ARNOLDO Deluna

## 2017-10-16 NOTE — MR AVS SNAPSHOT
Visit Information Date & Time Provider Department Dept. Phone Encounter #  
 10/16/2017  8:00 AM Edith León NP [address] [phone] [id number] Upcoming Health Maintenance Date Due Hepatitis A Peds Age 1-18 (2 of 3 - Twinrix 3-Dose Series) 1/31/2017 HPV AGE 9Y-26Y (2 of 3 - Female 3 Dose Series) 2/28/2017 INFLUENZA AGE 9 TO ADULT 8/1/2017 DTaP/Tdap/Td series (7 - Td) 5/29/2018 Allergies as of 10/16/2017  Review Complete On: 10/16/2017 By: Rogena Brittle, RN Severity Noted Reaction Type Reactions Tree Nut  08/27/2013    Unknown (comments) Current Immunizations  Never Reviewed Name Date DTaP 11/29/2001, 2/11/1999, 5/12/1998, 3/11/1998, 1/6/1998 Hep B Vaccine 5/12/1998, 1997, 1997 MMR 11/29/2001, 2/11/1999 Meningococcal ACWY Vaccine 7/13/2011 Poliovirus vaccine 11/29/2001, 11/6/1998, 3/11/1998, 1/6/1998 TB Skin Test (PPD) 11/6/1998 TB Skin Test (PPD) Intradermal 7/11/2017  8:43 AM, 9/2/2014 Tdap 5/29/2008 Varicella Virus Vaccine 11/6/1998 Not reviewed this visit Vitals BP Pulse Temp Height(growth percentile) Weight(growth percentile) 96/68 (12 %/ 70 %)* (BP 1 Location: Right arm, BP Patient Position: Sitting) 97 97.6 °F (36.4 °C) (Temporal) 5' 4\" (1.626 m) (45 %, Z= -0.12) 138 lb 12.8 oz (63 kg) (67 %, Z= 0.45) SpO2 BMI OB Status Smoking Status 99% 23.82 kg/m2 (71 %, Z= 0.55) Having regular periods Never Smoker *BP percentiles are based on NHBPEP's 4th Report Growth percentiles are based on CDC 2-20 Years data. BMI and BSA Data Body Mass Index Body Surface Area  
 23.82 kg/m 2 1.69 m 2 [facility] [address] [place] [phone] Your Updated Medication List  
  
   
This list is accurate as of: 10/16/17  8:40 AM.  Always use your most recent med list.  
  
  
  
  
 albuterol 90 mcg/actuation inhaler Commonly known as:  PROVENTIL HFA, VENTOLIN HFA, PROAIR HFA Take 2 Puffs by inhalation every four (4) hours as needed for Wheezing. fluticasone 110 mcg/actuation inhaler Commonly known as:  FLOVENT HFA Take 2 Puffs by inhalation every twelve (12) hours. Indications: MAINTENANCE THERAPY FOR ASTHMA  
  
 loratadine 10 mg tablet Commonly known as:  Eddie Gilman Take 10 mg by mouth. NASACORT AQ 55 mcg nasal inhaler Generic drug:  triamcinolone 1 Spray daily. ZyrTEC 10 mg tablet Generic drug:  cetirizine Take  by mouth. Prescriptions Sent to Pharmacy Refills  
 albuterol (PROVENTIL HFA, VENTOLIN HFA, PROAIR HFA) 90 mcg/actuation inhaler 2 Sig: Take 2 Puffs by inhalation every four (4) hours as needed for Wheezing. Class: Normal  
 Pharmacy: [address], [address] Ph #: [phone] Route: Inhalation  
 fluticasone (FLOVENT HFA) 110 mcg/actuation inhaler 11 Sig: Take 2 Puffs by inhalation every twelve (12) hours. Indications: MAINTENANCE THERAPY FOR ASTHMA Class: Normal  
 Pharmacy: [address], [address] Ph #: [phone] Route: Inhalation Introducing [facility] SERVICES! Darion Philippeana introduces [employer] patient portal. Now you can access parts of your medical record, email your doctor's office, and request medication refills online. 1. In your internet browser, go to [email or web address]. [email or web address]/[email or web address] 2. Click on the First Time User? Click Here link in the Sign In box. You will see the New Member Sign Up page. 3. Enter your [employer] Access Code exactly as it appears below. You will not need to use this code after youve completed the sign-up process. If you do not sign up before the expiration date, you must request a new code.  
 
· [employer] Access Code: [email or web address] 
 Expires: 1/14/2018  8:40 AM 
 
4. Enter the last four digits of your Social Security Number (xxxx) and Date of Birth (mm/dd/yyyy) as indicated and click Submit. You will be taken to the next sign-up page. 5. Create a [employer] ID. This will be your [employer] login ID and cannot be changed, so think of one that is secure and easy to remember. 6. Create a [employer] password. You can change your password at any time. 7. Enter your Password Reset Question and Answer. This can be used at a later time if you forget your password. 8. Enter your e-mail address. You will receive e-mail notification when new information is available in [address] 9. Click Sign Up. You can now view and download portions of your medical record. 10. Click the Download Summary menu link to download a portable copy of your medical information. If you have questions, please visit the Frequently Asked Questions section of the [employer] website. Remember, [employer] is NOT to be used for urgent needs. For medical emergencies, dial 911. Now available from your iPhone and Android! Please provide this summary of care documentation to your next provider. Your primary care clinician is listed as Demetria Servin. If you have any questions after today's visit, please call [phone].

## 2018-04-09 ENCOUNTER — TELEPHONE (OUTPATIENT)
Dept: FAMILY MEDICINE CLINIC | Age: 23
End: 2018-04-09

## 2018-04-09 NOTE — TELEPHONE ENCOUNTER
Called to speak with patient had to leave a message @ [phone] (home)   Wanted to offer an appointment with Karis Ballard  NP @ 230 today.

## 2018-04-09 NOTE — TELEPHONE ENCOUNTER
Pt called and stated that she has a sinus infection and was wondering if she could be seen today. Call Dexter Duane at [phone].

## 2018-04-16 ENCOUNTER — OFFICE VISIT (OUTPATIENT)
Dept: FAMILY MEDICINE CLINIC | Age: 23
End: 2018-04-16

## 2018-04-16 VITALS
HEART RATE: 93 BPM | HEIGHT: 64 IN | BODY MASS INDEX: 23.7 KG/M2 | TEMPERATURE: 97.2 F | RESPIRATION RATE: 18 BRPM | OXYGEN SATURATION: 98 % | WEIGHT: 138.8 LBS | DIASTOLIC BLOOD PRESSURE: 80 MMHG | SYSTOLIC BLOOD PRESSURE: 122 MMHG

## 2018-04-16 DIAGNOSIS — J02.0 STREP PHARYNGITIS: Primary | ICD-10-CM

## 2018-04-16 DIAGNOSIS — J02.9 SORE THROAT: ICD-10-CM

## 2018-04-16 LAB
S PYO AG THROAT QL: POSITIVE
VALID INTERNAL CONTROL?: YES

## 2018-04-16 RX ORDER — CLARITHROMYCIN 500 MG/1
500 TABLET, FILM COATED ORAL 2 TIMES DAILY
Qty: 20 TAB | Refills: 0 | Status: SHIPPED | OUTPATIENT
Start: 2018-04-16 | End: 2018-04-26

## 2018-04-16 RX ORDER — CLARITHROMYCIN 500 MG/1
500 TABLET, FILM COATED ORAL 2 TIMES DAILY
Qty: 20 TAB | Refills: 0 | Status: SHIPPED | OUTPATIENT
Start: 2018-04-16 | End: 2018-04-16 | Stop reason: SDUPTHER

## 2018-04-16 RX ORDER — AMOXICILLIN AND CLAVULANATE POTASSIUM 875; 125 MG/1; MG/1
TABLET, FILM COATED ORAL
Refills: 0 | COMMUNITY
Start: 2018-04-09 | End: 2018-04-16 | Stop reason: SDUPTHER

## 2018-04-16 RX ORDER — NORETHINDRONE 0.35 MG/1
TABLET ORAL
Refills: 15 | COMMUNITY
Start: 2018-03-20

## 2018-04-16 NOTE — PROGRESS NOTES
1. Have you been to the ER, urgent care clinic since your last visit? Hospitalized since your last visit? yes,1 week ago,[facility], sinus infection. 2. Have you seen or consulted any other health care providers outside of the [address] [place] [address] since your last visit? Include any pap smears or colon screening.  No

## 2018-04-16 NOTE — MR AVS SNAPSHOT
[address] [facility] [address] 
[phone] Patient: Park Traylor MRN: [id number] ISX:[id number] Visit Information Date & Time Provider Department Dept. Phone Encounter #  
 4/16/2018  3:00 PM Jerrye Goodpasture, NP [facility] [address] [facility] [phone] [id number] Upcoming Health Maintenance Date Due Hepatitis A Peds Age 1-18 (2 of 3 - Twinrix 3-Dose Series) 1/31/2017 HPV Age 9Y-34Y (2 of 3 - Female 3 Dose Series) 2/28/2017 Influenza Age 5 to Adult 8/1/2017 DTaP/Tdap/Td series (7 - Td) 5/29/2018 Allergies as of 4/16/2018  Review Complete On: 4/16/2018 By: Jerrye Goodpasture, NP Severity Noted Reaction Type Reactions Tree Nut  08/27/2013    Unknown (comments) Current Immunizations  Never Reviewed Name Date DTaP 11/29/2001, 2/11/1999, 5/12/1998, 3/11/1998, 1/6/1998 Hep B Vaccine 5/12/1998, 1997, 1997 MMR 11/29/2001, 2/11/1999 Meningococcal ACWY Vaccine 7/13/2011 Poliovirus vaccine 11/29/2001, 11/6/1998, 3/11/1998, 1/6/1998 TB Skin Test (PPD) 11/6/1998 TB Skin Test (PPD) Intradermal 7/11/2017  8:43 AM, 9/2/2014 Tdap 5/29/2008 Varicella Virus Vaccine 11/6/1998 Not reviewed this visit You Were Diagnosed With   
  
 Codes Comments Strep pharyngitis    -  Primary ICD-10-CM: J02.0 ICD-9-CM: 034.0 Sore throat     ICD-10-CM: J02.9 ICD-9-CM: 470 BMI 23.0-23.9, adult     ICD-10-CM: Z68.23 
ICD-9-CM: V85.1 Vitals BP Pulse Temp Resp Height(growth percentile) 122/80 (BP 1 Location: Left arm, BP Patient Position: Sitting) 93 97.2 °F (36.2 °C) (Temporal) 18 5' 4\" (1.626 m) Weight(growth percentile) SpO2 BMI OB Status Smoking Status 138 lb 12.8 oz (63 kg) 98% 23.82 kg/m2 Having regular periods Never Smoker Vitals History BMI and BSA Data  Body Mass Index Body Surface Area  
 23.82 kg/m 2 1.69 m 2  
  
  
 Preferred Pharmacy Pharmacy Name Phone [id number] [phone] Your Updated Medication List  
  
   
This list is accurate as of 4/16/18  3:37 PM.  Always use your most recent med list.  
  
  
  
  
 albuterol 90 mcg/actuation inhaler Commonly known as:  PROVENTIL HFA, VENTOLIN HFA, PROAIR HFA Take 2 Puffs by inhalation every four (4) hours as needed for Wheezing. BALJIT 0.35 mg Tab Generic drug:  norethindrone TAKE 1 TABLET BY MOUTH EVERY DAY  
  
 clarithromycin 500 mg tablet Commonly known as:  Anastacia Johnathan Take 1 Tab by mouth two (2) times a day for 10 days. Indications: PHARYNGITIS DUE TO STREPTOCOCCUS PYOGENES  
  
 fluticasone 110 mcg/actuation inhaler Commonly known as:  FLOVENT HFA Take 2 Puffs by inhalation every twelve (12) hours. Indications: MAINTENANCE THERAPY FOR ASTHMA  
  
 loratadine 10 mg tablet Commonly known as:  Rosy Buddle Take 10 mg by mouth. NASACORT AQ 55 mcg nasal inhaler Generic drug:  triamcinolone 1 Spray daily. ZyrTEC 10 mg tablet Generic drug:  cetirizine Take  by mouth. Prescriptions Sent to Pharmacy Refills  
 clarithromycin (BIAXIN) 500 mg tablet 0 Sig: Take 1 Tab by mouth two (2) times a day for 10 days. Indications: PHARYNGITIS DUE TO STREPTOCOCCUS PYOGENES Class: Normal  
 Pharmacy: [facility]/pharmacy #[id number] [address] Ph #: [phone] Route: Oral  
  
We Performed the Following AMB POC RAPID STREP A [55473 CPT(R)] Introducing [facility] SERVICES! Dillan Velasquez introduces [employer] patient portal. Now you can access parts of your medical record, email your doctor's office, and request medication refills online. 1. In your internet browser, go to [email or web address]. Grovo/[employer] 2. Click on the First Time User? Click Here link in the Sign In box. You will see the New Member Sign Up page. 3. Enter your [employer] Access Code exactly as it appears below. You will not need to use this code after youve completed the sign-up process. If you do not sign up before the expiration date, you must request a new code. · [employer] Access Code: [email or web address] Expires: 7/15/2018  3:37 PM 
 
4. Enter the last four digits of your Social Security Number (xxxx) and Date of Birth (mm/dd/yyyy) as indicated and click Submit. You will be taken to the next sign-up page. 5. Create a [employer] ID. This will be your [employer] login ID and cannot be changed, so think of one that is secure and easy to remember. 6. Create a [employer] password. You can change your password at any time. 7. Enter your Password Reset Question and Answer. This can be used at a later time if you forget your password. 8. Enter your e-mail address. You will receive e-mail notification when new information is available in [address] 9. Click Sign Up. You can now view and download portions of your medical record. 10. Click the Download Summary menu link to download a portable copy of your medical information. If you have questions, please visit the Frequently Asked Questions section of the [employer] website. Remember, [employer] is NOT to be used for urgent needs. For medical emergencies, dial 911. Now available from your iPhone and Android! Please provide this summary of care documentation to your next provider. Your primary care clinician is listed as Del Lemon. If you have any questions after today's visit, please call [phone].

## 2018-04-16 NOTE — PROGRESS NOTES
Subjective:   Christi Felipe is a 23 y.o. female who complains of sore throat, swollen glands and dry cough for 5 days. She denies a history of shortness of breath and wheezing. Patient does not smoke cigarettes. Relevant PMH: pertinent medical hx currently completing a course of augment for sinusitis. Objective:      Visit Vitals    /80 (BP 1 Location: Left arm, BP Patient Position: Sitting)    Pulse 93    Temp 97.2 °F (36.2 °C) (Temporal)    Resp 18    Ht 5' 4\" (1.626 m)    Wt 138 lb 12.8 oz (63 kg)    SpO2 98%    BMI 23.82 kg/m2      Appears in mild to moderate distress. Ears: bilateral TM's and external ear canals normal  Oropharynx: erythematous and exudate noted  Neck: bilateral symmetric anterior adenopathy  Lungs: clear to auscultation, no wheezes, rales or rhonchi, symmetric air entry  The abdomen is soft without tenderness or hepatosplenomegaly. Rapid Strep test is positive    Assessment/Plan:   strep pharyngitis  Per orders. Gargle, use acetaminophen or other OTC analgesic, and take Rx fully as prescribed. Call if other family members develop similar symptoms. See prn. ICD-10-CM ICD-9-CM    1. Strep pharyngitis J02.0 034.0    2. Sore throat J02.9 462 AMB POC RAPID STREP A   3. BMI 23.0-23.9, adult Z68.23 V85.1      Orders Placed This Encounter    AMB POC RAPID STREP A    DISCONTD: amoxicillin-clavulanate (AUGMENTIN) 875-125 mg per tablet    BALJIT 0.35 mg tab    DISCONTD: clarithromycin (BIAXIN) 500 mg tablet    clarithromycin (BIAXIN) 500 mg tablet     Diagnoses and all orders for this visit:    1. Strep pharyngitis    2. Sore throat  -     AMB POC RAPID STREP A    3. BMI 23.0-23.9, adult    Other orders  -     clarithromycin (BIAXIN) 500 mg tablet; Take 1 Tab by mouth two (2) times a day for 10 days. Indications: PHARYNGITIS DUE TO STREPTOCOCCUS PYOGENES    Stop Augmentin    BMI{Discussed the patient's BMI with her.   The BMI follow up plan is as follows:     dietary management education, guidance, and counseling  encourage exercise  monitor weight  prescribed dietary intake    An After Visit Summary was printed and given to the patient. Follow-up Disposition:  Return if symptoms worsen or fail to improve. Juani MONTIELC

## 2018-04-16 NOTE — ACP (ADVANCE CARE PLANNING)
Discussed importance of advanced medical directives with patient. Patient is capable of making decisions.   Deric Every NP-C

## 2023-05-20 RX ORDER — TRIAMCINOLONE ACETONIDE 55 UG/1
1 SPRAY, METERED NASAL DAILY
COMMUNITY

## 2023-05-20 RX ORDER — CETIRIZINE HYDROCHLORIDE 10 MG/1
TABLET ORAL
COMMUNITY

## 2023-05-20 RX ORDER — LORATADINE 10 MG/1
10 TABLET ORAL
COMMUNITY

## 2023-05-20 RX ORDER — ACETAMINOPHEN AND CODEINE PHOSPHATE 120; 12 MG/5ML; MG/5ML
1 SOLUTION ORAL DAILY
COMMUNITY
Start: 2018-03-20

## 2023-05-20 RX ORDER — ALBUTEROL SULFATE 90 UG/1
AEROSOL, METERED RESPIRATORY (INHALATION)
COMMUNITY
Start: 2021-11-16

## 2023-05-20 RX ORDER — FLUTICASONE PROPIONATE 110 UG/1
AEROSOL, METERED RESPIRATORY (INHALATION)
COMMUNITY
Start: 2021-11-16